# Patient Record
Sex: FEMALE | Race: WHITE | NOT HISPANIC OR LATINO | Employment: OTHER | URBAN - METROPOLITAN AREA
[De-identification: names, ages, dates, MRNs, and addresses within clinical notes are randomized per-mention and may not be internally consistent; named-entity substitution may affect disease eponyms.]

---

## 2019-11-12 ENCOUNTER — NEW PATIENT COMPREHENSIVE (OUTPATIENT)
Dept: URBAN - METROPOLITAN AREA CLINIC 43 | Facility: CLINIC | Age: 49
End: 2019-11-12

## 2019-11-12 DIAGNOSIS — H52.03: ICD-10-CM

## 2019-11-12 DIAGNOSIS — H52.4: ICD-10-CM

## 2019-11-12 PROCEDURE — 92004CFS NEW PATIENT- EMPLOYEE ROUTINE COMP EXAM

## 2019-11-12 ASSESSMENT — TONOMETRY
OS_IOP_MMHG: 16
OD_IOP_MMHG: 16

## 2019-11-12 ASSESSMENT — VISUAL ACUITY
OD_SC: J12
OD_SC: 20/60-2
OD_CC: J2
OS_CC: J3
OS_SC: >J12
OS_CC: 20/20
OS_SC: 20/200
OD_CC: 20/25

## 2021-05-20 ENCOUNTER — EST. PATIENT EMERGENCY (OUTPATIENT)
Dept: URBAN - METROPOLITAN AREA CLINIC 43 | Facility: CLINIC | Age: 51
End: 2021-05-20

## 2021-05-20 DIAGNOSIS — H52.03: ICD-10-CM

## 2021-05-20 DIAGNOSIS — H52.4: ICD-10-CM

## 2021-05-20 PROCEDURE — 92015GRNC REFRACTION GLASSES RECHECK - NO CHARGE

## 2021-05-20 ASSESSMENT — VISUAL ACUITY
OS_CC: 20/50-2
OS_CC: J2-
OD_CC: 20/30-2
OS_PH: 20/30
OD_CC: J2-
OD_PH: 20/30

## 2021-05-20 ASSESSMENT — TONOMETRY
OD_IOP_MMHG: 16
OS_IOP_MMHG: 16

## 2021-06-15 ENCOUNTER — GLASSES RECHECK (OUTPATIENT)
Dept: URBAN - METROPOLITAN AREA CLINIC 43 | Facility: CLINIC | Age: 51
End: 2021-06-15

## 2021-06-15 DIAGNOSIS — H52.03: ICD-10-CM

## 2021-06-15 DIAGNOSIS — H52.4: ICD-10-CM

## 2021-06-15 PROCEDURE — 92015GRNC REFRACTION GLASSES RECHECK - NO CHARGE

## 2021-06-15 ASSESSMENT — VISUAL ACUITY
OS_CC: J2
OS_CC: 20/40-1
OD_CC: J3
OD_CC: 20/30

## 2022-03-31 NOTE — PATIENT DISCUSSION
DERMATOCHALASIS  OU: BOTHERSOME TO PATIENT. CONSULT DR. Montrell Traylor FOR SURGICAL EVALUATION AFTER CATARACT SURGERY.

## 2022-03-31 NOTE — PATIENT DISCUSSION
CATARACTS, OU: VISUALLY SIGNIFICANT. OPTION OF SURGERY VERSUS FOLLOWING VERSUS UPDATING GLASSES DISCUSSED.  RBA'S DISCUSSED, PATIENT UNDERSTANDS AND DESIRES SURGERY TO INCREASE VISION FOR DRIVING AT NIGHT.  SCHEDULE CATARACT SURGERY/PRE-OP.

## 2022-04-18 NOTE — PATIENT DISCUSSION
Risks, benefits, limitations, and alternatives of cataract extraction discussed with patient, including but not limited to: bleeding, infection, acute or chronic intraocular inflammation, retinal hole/tear/detachment, increased or decreased intraocular pressure, macular edema, corneal edema, posterior capsule opacification, ptosis, irregular pupil, no improvement in vision, worsened vision, need for additional surgery, and death. Patient understands the risks and wishes to proceed and desires Standard set for distance vision OD.

## 2022-04-18 NOTE — PATIENT DISCUSSION
DERMATOCHALASIS  OU: BOTHERSOME TO PATIENT. CONSULT DR. Real Denson FOR SURGICAL EVALUATION AFTER CATARACT SURGERY.

## 2022-05-04 NOTE — PATIENT DISCUSSION
DERMATOCHALASIS  OU: BOTHERSOME TO PATIENT. CONSULT DR. Ben Romero FOR SURGICAL EVALUATION AFTER CATARACT SURGERY.

## 2022-05-16 NOTE — PATIENT DISCUSSION
DOING WELL.   CONTINUE DROPS AS DIRECTED.  SPECS RX OFFERED.  RX ARC IN SPECS TO MINIMIZE GLARE.   RETURN FOR FOLLOW-UP AS SCHEDULED.

## 2022-05-16 NOTE — PATIENT DISCUSSION
DERMATOCHALASIS  OU: BOTHERSOME TO PATIENT. CONSULT DR. Darby Hamman FOR SURGICAL EVALUATION AFTER CATARACT SURGERY.

## 2022-05-16 NOTE — PATIENT DISCUSSION
RBA'S DISCUSSED, PATIENT UNDERSTANDS AND DESIRES TO PROCEED WITH SURGERY.  CONSENT READ AND SIGNED.  PATIENT DESIRES STANDARD FOR DISTANCE LEFT EYE.

## 2022-05-16 NOTE — PATIENT DISCUSSION
The patient has noticed an improvement in their visual symptoms in the operative eye. The patient complains of decreased vision in the fellow eye when driving at night.   It was explained to the patient that the decision to proceed with cataract surgery in the fellow eye is entirely a separate decision from the surgical eye.  All of the same risks, benefits and alternatives ere reviewed with the patient again.   The patient does feel the vision in the non-operative eye is limiting their daily activities and elects to proceed with cataract surgery in the LEFT eye.  Schedule cataract surgery/ pre op OS.

## 2022-06-01 NOTE — PATIENT DISCUSSION
DERMATOCHALASIS  OU: BOTHERSOME TO PATIENT. CONSULT DR. Siddharth Sr FOR SURGICAL EVALUATION AFTER CATARACT SURGERY.

## 2022-06-13 NOTE — PATIENT DISCUSSION
Doing well. Will increase Pred forte to QID x 1 week then TID x 1 week then BID x 5 days then QD x 5 days then D/C. RTC 2-3 weeks f/u/final visit.

## 2022-07-07 NOTE — PATIENT DISCUSSION
DERMATOCHALASIS  OU: BOTHERSOME TO PATIENT. CONSULT DR. Merry Gutierrez FOR SURGICAL EVALUATION AFTER CATARACT SURGERY.

## 2022-08-23 NOTE — PATIENT DISCUSSION
Cosmetic: Not visually significant enough for insurance to cover surgery. Patient understands Blepharoplasty will be performed for cosmetic reasons and understands payment is an out-of-pocket expense. Discussed risks, benefits, inflammation, infection, and bleeding. Patient elects to proceed with Blepharoplasty.

## 2022-08-23 NOTE — PATIENT DISCUSSION
instructed patient that will follow back after surgery because at this point more tears are better .

## 2022-11-29 NOTE — PATIENT DISCUSSION
Patient still has some swelling OU. Patient to start maxitrol bid ou and will see back in 6 weeks to recheck.

## 2023-06-08 ENCOUNTER — FOLLOW UP (OUTPATIENT)
Dept: URBAN - METROPOLITAN AREA CLINIC 43 | Facility: CLINIC | Age: 53
End: 2023-06-08

## 2023-06-08 DIAGNOSIS — H52.03: ICD-10-CM

## 2023-06-08 DIAGNOSIS — H52.4: ICD-10-CM

## 2023-06-08 PROCEDURE — 92015GRNC REFRACTION GLASSES RECHECK - NO CHARGE

## 2023-06-08 ASSESSMENT — VISUAL ACUITY
OD_CC: 20/20
OS_CC: J2
OS_CC: 20/20
OU_CC: 20/20
OD_CC: J6

## 2023-09-07 ENCOUNTER — CONSULTATION/EVALUATION (OUTPATIENT)
Dept: URBAN - METROPOLITAN AREA CLINIC 44 | Facility: CLINIC | Age: 53
End: 2023-09-07

## 2023-09-07 VITALS — WEIGHT: 175.8 LBS | BODY MASS INDEX: 27.59 KG/M2 | HEIGHT: 67 IN

## 2023-09-07 PROCEDURE — 96999SEM SEMAGLUTIDE

## 2023-09-14 ENCOUNTER — FOLLOW UP (OUTPATIENT)
Dept: URBAN - METROPOLITAN AREA CLINIC 44 | Facility: CLINIC | Age: 53
End: 2023-09-14

## 2023-09-21 ENCOUNTER — FOLLOW UP (OUTPATIENT)
Dept: URBAN - METROPOLITAN AREA CLINIC 44 | Facility: CLINIC | Age: 53
End: 2023-09-21

## 2023-09-21 PROCEDURE — 96999SEM SEMAGLUTIDE

## 2023-09-28 ENCOUNTER — FOLLOW UP (OUTPATIENT)
Dept: URBAN - METROPOLITAN AREA CLINIC 44 | Facility: CLINIC | Age: 53
End: 2023-09-28

## 2023-09-28 PROCEDURE — 96999SEM SEMAGLUTIDE

## 2023-10-12 ENCOUNTER — FOLLOW UP (OUTPATIENT)
Dept: URBAN - METROPOLITAN AREA CLINIC 44 | Facility: CLINIC | Age: 53
End: 2023-10-12

## 2023-10-12 PROCEDURE — 96999SEM SEMAGLUTIDE

## 2023-10-19 ENCOUNTER — FOLLOW UP (OUTPATIENT)
Dept: URBAN - METROPOLITAN AREA CLINIC 44 | Facility: CLINIC | Age: 53
End: 2023-10-19

## 2023-10-19 PROCEDURE — 96999SEM SEMAGLUTIDE

## 2023-10-26 ENCOUNTER — FOLLOW UP (OUTPATIENT)
Dept: URBAN - METROPOLITAN AREA CLINIC 44 | Facility: CLINIC | Age: 53
End: 2023-10-26

## 2023-10-26 PROCEDURE — 96999SEM SEMAGLUTIDE

## 2023-11-02 ENCOUNTER — FOLLOW UP (OUTPATIENT)
Dept: URBAN - METROPOLITAN AREA CLINIC 44 | Facility: CLINIC | Age: 53
End: 2023-11-02

## 2023-11-02 PROCEDURE — 96999SEM SEMAGLUTIDE

## 2023-11-09 ENCOUNTER — FOLLOW UP (OUTPATIENT)
Dept: URBAN - METROPOLITAN AREA CLINIC 44 | Facility: CLINIC | Age: 53
End: 2023-11-09

## 2023-11-09 PROCEDURE — 96999SEM SEMAGLUTIDE

## 2023-11-16 ENCOUNTER — FOLLOW UP (OUTPATIENT)
Dept: URBAN - METROPOLITAN AREA CLINIC 44 | Facility: CLINIC | Age: 53
End: 2023-11-16

## 2023-11-16 PROCEDURE — 96999SEM SEMAGLUTIDE

## 2023-12-07 ENCOUNTER — FOLLOW UP (OUTPATIENT)
Dept: URBAN - METROPOLITAN AREA CLINIC 44 | Facility: CLINIC | Age: 53
End: 2023-12-07

## 2023-12-07 PROCEDURE — 96999SEM SEMAGLUTIDE

## 2023-12-21 ENCOUNTER — FOLLOW UP (OUTPATIENT)
Dept: URBAN - METROPOLITAN AREA CLINIC 44 | Facility: CLINIC | Age: 53
End: 2023-12-21

## 2023-12-21 PROCEDURE — 96999SEM SEMAGLUTIDE

## 2024-01-04 ENCOUNTER — FOLLOW UP (OUTPATIENT)
Dept: URBAN - METROPOLITAN AREA CLINIC 44 | Facility: CLINIC | Age: 54
End: 2024-01-04

## 2024-01-04 PROCEDURE — 96999SEM SEMAGLUTIDE

## 2024-01-11 ENCOUNTER — FOLLOW UP (OUTPATIENT)
Dept: URBAN - METROPOLITAN AREA CLINIC 44 | Facility: CLINIC | Age: 54
End: 2024-01-11

## 2024-01-11 PROCEDURE — 96999SEM SEMAGLUTIDE

## 2024-01-18 ENCOUNTER — FOLLOW UP (OUTPATIENT)
Dept: URBAN - METROPOLITAN AREA CLINIC 44 | Facility: CLINIC | Age: 54
End: 2024-01-18

## 2024-01-18 PROCEDURE — 96999SEM SEMAGLUTIDE

## 2024-02-22 ENCOUNTER — FOLLOW UP (OUTPATIENT)
Dept: URBAN - METROPOLITAN AREA CLINIC 44 | Facility: CLINIC | Age: 54
End: 2024-02-22

## 2024-02-22 PROCEDURE — 96999SEM SEMAGLUTIDE

## 2024-02-29 ENCOUNTER — FOLLOW UP (OUTPATIENT)
Dept: URBAN - METROPOLITAN AREA CLINIC 44 | Facility: CLINIC | Age: 54
End: 2024-02-29

## 2024-02-29 PROCEDURE — 96999SEM SEMAGLUTIDE

## 2024-03-07 ENCOUNTER — FOLLOW UP (OUTPATIENT)
Dept: URBAN - METROPOLITAN AREA CLINIC 44 | Facility: CLINIC | Age: 54
End: 2024-03-07

## 2024-03-07 PROCEDURE — 96999SEM SEMAGLUTIDE

## 2024-03-21 ENCOUNTER — FOLLOW UP (OUTPATIENT)
Dept: URBAN - METROPOLITAN AREA CLINIC 44 | Facility: CLINIC | Age: 54
End: 2024-03-21

## 2024-03-21 PROCEDURE — 96999SEM SEMAGLUTIDE

## 2024-05-09 ENCOUNTER — FOLLOW UP (OUTPATIENT)
Dept: URBAN - METROPOLITAN AREA CLINIC 44 | Facility: CLINIC | Age: 54
End: 2024-05-09

## 2024-05-09 PROCEDURE — 96999SEM SEMAGLUTIDE

## 2024-05-16 ENCOUNTER — FOLLOW UP (OUTPATIENT)
Dept: URBAN - METROPOLITAN AREA CLINIC 44 | Facility: CLINIC | Age: 54
End: 2024-05-16

## 2024-05-16 PROCEDURE — 96999SEM SEMAGLUTIDE

## 2024-05-23 ENCOUNTER — FOLLOW UP (OUTPATIENT)
Dept: URBAN - METROPOLITAN AREA CLINIC 44 | Facility: CLINIC | Age: 54
End: 2024-05-23

## 2024-05-23 PROCEDURE — 96999SEM SEMAGLUTIDE

## 2024-07-18 ENCOUNTER — FOLLOW UP (OUTPATIENT)
Dept: URBAN - METROPOLITAN AREA CLINIC 44 | Facility: CLINIC | Age: 54
End: 2024-07-18

## 2024-07-18 PROCEDURE — 96999SEM SEMAGLUTIDE: Mod: PW,RT

## 2025-05-08 ENCOUNTER — COMPREHENSIVE EXAM (OUTPATIENT)
Age: 55
End: 2025-05-08

## 2025-05-08 DIAGNOSIS — H52.4: ICD-10-CM

## 2025-05-08 DIAGNOSIS — H52.03: ICD-10-CM

## 2025-05-08 PROCEDURE — 92014EYE ESTABLISHED PATIENT - EMPLOYEE ROUTINE COMP EXAM
